# Patient Record
Sex: FEMALE | URBAN - METROPOLITAN AREA
[De-identification: names, ages, dates, MRNs, and addresses within clinical notes are randomized per-mention and may not be internally consistent; named-entity substitution may affect disease eponyms.]

---

## 2022-08-24 ENCOUNTER — OFFICE VISIT (OUTPATIENT)
Dept: URBAN - METROPOLITAN AREA CLINIC 52 | Facility: CLINIC | Age: 77
End: 2022-08-24
Payer: MEDICARE

## 2022-08-24 DIAGNOSIS — H35.362 DRUSEN (DEGENERATIVE) OF MACULA, LEFT EYE: ICD-10-CM

## 2022-08-24 DIAGNOSIS — H02.88B MEIBOMIAN GLAND DYSFNCT LEFT EYE, UPPER AND LOWER EYELIDS: ICD-10-CM

## 2022-08-24 DIAGNOSIS — H02.88A MEIBOMIAN GLAND DYSFNCT RIGHT EYE, UPPER AND LOWER EYELIDS: ICD-10-CM

## 2022-08-24 DIAGNOSIS — H01.02A SQUAMOUS BLEPHARITIS RIGHT EYE, UPPER AND LOWER EYELIDS: Primary | ICD-10-CM

## 2022-08-24 DIAGNOSIS — H01.02B SQUAMOUS BLEPHARITIS LEFT EYE, UPPER AND LOWER EYELIDS: ICD-10-CM

## 2022-08-24 DIAGNOSIS — H04.123 DRY EYE SYNDROME OF BILATERAL LACRIMAL GLANDS: ICD-10-CM

## 2022-08-24 DIAGNOSIS — H25.13 AGE-RELATED NUCLEAR CATARACT, BILATERAL: ICD-10-CM

## 2022-08-24 PROCEDURE — 99203 OFFICE O/P NEW LOW 30 MIN: CPT | Performed by: OPTOMETRIST

## 2022-08-24 RX ORDER — DOXYCYCLINE HYCLATE 50 MG/1
50 MG CAPSULE, GELATIN COATED ORAL
Qty: 120 | Refills: 0 | Status: ACTIVE
Start: 2022-08-24

## 2022-08-24 RX ORDER — FLUOROMETHOLONE 1 MG/ML
0.1 % SOLUTION/ DROPS OPHTHALMIC
Qty: 5 | Refills: 1 | Status: ACTIVE
Start: 2022-08-24

## 2022-08-24 ASSESSMENT — INTRAOCULAR PRESSURE
OS: 15
OD: 15

## 2022-08-24 NOTE — IMPRESSION/PLAN
Impression: Dry eye syndrome of bilateral lacrimal glands: H04.123. Plan: Start ATs QID OU, warm compresses BID OU.

## 2022-08-24 NOTE — IMPRESSION/PLAN
Impression: Squamous blepharitis right eye, upper and lower eyelids: H01.02A. Plan: Educated patient on condition and discussed treatment options. Start Ocusoft lid scrubs BID OU, hot compresses BID OU. Start Doxycycline 50mg BID x 60 days. Flarex BID OU x 7 days.

## 2022-08-24 NOTE — IMPRESSION/PLAN
Impression: Drusen (degenerative) of macula, left eye: H35.362. Plan: Educated patient on exam findings and discussed importance of continued monitoring. Discussed visual prognosis and importance of maintaining a healthy diet with leafy greens and yellow vegetables, routine physical activity, and no smoking. No supplements indicated at this time. Monitor annually with MAC OCT/DFE.

## 2022-10-26 ENCOUNTER — OFFICE VISIT (OUTPATIENT)
Dept: URBAN - METROPOLITAN AREA CLINIC 52 | Facility: CLINIC | Age: 77
End: 2022-10-26
Payer: MEDICARE

## 2022-10-26 DIAGNOSIS — H01.02B SQUAMOUS BLEPHARITIS LEFT EYE, UPPER AND LOWER EYELIDS: ICD-10-CM

## 2022-10-26 DIAGNOSIS — H02.055 TRICHIASIS WITHOUT ENTROPION LEFT LOWER EYELID: ICD-10-CM

## 2022-10-26 DIAGNOSIS — H01.02A SQUAMOUS BLEPHARITIS RIGHT EYE, UPPER AND LOWER EYELIDS: Primary | ICD-10-CM

## 2022-10-26 PROCEDURE — 99212 OFFICE O/P EST SF 10 MIN: CPT | Performed by: OPTOMETRIST

## 2022-10-26 PROCEDURE — 67820 REVISE EYELASHES: CPT | Performed by: OPTOMETRIST

## 2022-10-26 ASSESSMENT — INTRAOCULAR PRESSURE
OS: 12
OD: 14

## 2022-10-26 NOTE — IMPRESSION/PLAN
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 Plan: Removed 1 inward turned eyelash inferior lid with sterile forceps. Procedure well-tolerated by patient.

## 2022-10-26 NOTE — IMPRESSION/PLAN
Impression: Squamous blepharitis right eye, upper and lower eyelids: H01.02A. Plan: Improved. D/c Doxy. Continue ATs QID OU and warm compresses. Use Flarex only as needed.

## 2023-06-20 ENCOUNTER — OFFICE VISIT (OUTPATIENT)
Dept: URBAN - METROPOLITAN AREA CLINIC 52 | Facility: CLINIC | Age: 78
End: 2023-06-20
Payer: MEDICARE

## 2023-06-20 DIAGNOSIS — H00.015 HORDEOLUM EXTERNUM LEFT LOWER EYELID: Primary | ICD-10-CM

## 2023-06-20 PROCEDURE — 99213 OFFICE O/P EST LOW 20 MIN: CPT

## 2023-06-20 RX ORDER — AMOXICILLIN AND CLAVULANATE POTASSIUM 500; 125 MG/1; 1/1
TABLET, FILM COATED ORAL
Qty: 20 | Refills: 0 | Status: ACTIVE
Start: 2023-06-20

## 2023-06-20 RX ORDER — NEOMYCIN SULFATE, POLYMYXIN B SULFATE AND DEXAMETHASONE 3.5; 10000; 1 MG/ML; [USP'U]/ML; MG/ML
SUSPENSION OPHTHALMIC
Qty: 10 | Refills: 0 | Status: ACTIVE
Start: 2023-06-20

## 2023-06-20 ASSESSMENT — INTRAOCULAR PRESSURE
OS: 12
OD: 10

## 2023-06-20 NOTE — IMPRESSION/PLAN
Impression: Hordeolum externum left lower eyelid: H00.015. Plan: Pt educated on condition. Pt started on steroid/antibiotic combo ointment and oral antibiotic. Also recommended pt start warm compresses with lid scrubs BID. Pt informed that baby shampoo can substitute for OTC lid scrubs. Pt to notify clinic if symptoms worsen or do not improve. Pt expressed understanding. Rx: 
maxitrol gtt QID x2 week Augmentin 500mg BID x10 days RTC: 2 weeks or sooner, prn

## 2023-07-11 ENCOUNTER — OFFICE VISIT (OUTPATIENT)
Dept: URBAN - METROPOLITAN AREA CLINIC 52 | Facility: CLINIC | Age: 78
End: 2023-07-11
Payer: MEDICARE

## 2023-07-11 DIAGNOSIS — H00.15 CHALAZION OF LEFT LOWER EYELID: Primary | ICD-10-CM

## 2023-07-11 PROCEDURE — 99213 OFFICE O/P EST LOW 20 MIN: CPT

## 2023-07-11 ASSESSMENT — INTRAOCULAR PRESSURE
OD: 12
OS: 12

## 2023-07-11 NOTE — IMPRESSION/PLAN
Impression: Chalazion of left lower eyelid: H00.15. Plan: Minimal improvement with medications. Recommend pt continue with WC with lid scrubs. Will refer pt to oculoplastics for further evaluation and treatment.